# Patient Record
Sex: MALE | Race: WHITE | URBAN - METROPOLITAN AREA
[De-identification: names, ages, dates, MRNs, and addresses within clinical notes are randomized per-mention and may not be internally consistent; named-entity substitution may affect disease eponyms.]

---

## 2017-01-04 ENCOUNTER — OFFICE (OUTPATIENT)
Dept: URBAN - METROPOLITAN AREA CLINIC 8 | Facility: CLINIC | Age: 62
Setting detail: OPHTHALMOLOGY
End: 2017-01-04
Payer: COMMERCIAL

## 2017-01-04 DIAGNOSIS — E11.9: ICD-10-CM

## 2017-01-04 DIAGNOSIS — H25.13: ICD-10-CM

## 2017-01-04 DIAGNOSIS — H52.4: ICD-10-CM

## 2017-01-04 PROCEDURE — 92014 COMPRE OPH EXAM EST PT 1/>: CPT | Performed by: OPHTHALMOLOGY

## 2017-01-04 PROCEDURE — 92015 DETERMINE REFRACTIVE STATE: CPT | Performed by: OPHTHALMOLOGY

## 2017-01-04 ASSESSMENT — REFRACTION_MANIFEST
OS_VA1: 20/20-
OD_CYLINDER: -1.00
OS_ADD: +2.25
OD_VA3: 20/
OS_VA2: 20/
OD_VA1: 20/25+
OD_ADD: +2.25
OD_VA2: 20/
OS_CYLINDER: -2.50
OS_VA3: 20/
OS_VA1: 20/
OU_VA: 20/
OS_VA3: 20/
OS_AXIS: 085
OD_SPHERE: -0.25
OS_VA2: 20/
OD_VA3: 20/
OS_SPHERE: +0.75
OD_AXIS: 035
OD_VA1: 20/
OU_VA: 20/
OD_VA2: 20/

## 2017-01-04 ASSESSMENT — CONFRONTATIONAL VISUAL FIELD TEST (CVF)
OD_FINDINGS: FULL
OS_FINDINGS: FULL

## 2017-01-04 ASSESSMENT — REFRACTION_OUTSIDERX
OD_VA2: 20/
OD_VA1: 20/20
OD_CYLINDER: -1.00
OS_VA2: 20/
OD_VA3: 20/
OS_ADD: +2.50
OS_VA1: 20/20
OS_AXIS: 085
OD_ADD: +2.50
OD_AXIS: 035
OS_CYLINDER: -2.25
OS_SPHERE: +0.75
OD_SPHERE: -0.50
OU_VA: 20/
OS_VA3: 20/

## 2017-01-04 ASSESSMENT — SPHEQUIV_DERIVED
OS_SPHEQUIV: -0.5
OD_SPHEQUIV: -0.75

## 2017-01-06 ASSESSMENT — SPHEQUIV_DERIVED
OS_SPHEQUIV: -0.25
OD_SPHEQUIV: -0.875

## 2017-01-06 ASSESSMENT — REFRACTION_CURRENTRX
OD_OVR_VA: 20/
OS_ADD: +2.25
OS_VPRISM_DIRECTION: PROGS
OS_CYLINDER: -2.50
OS_AXIS: 085
OD_OVR_VA: 20/
OD_OVR_VA: 20/
OD_VPRISM_DIRECTION: PROGS
OS_OVR_VA: 20/
OD_CYLINDER: -1.00
OS_SPHERE: +0.75
OD_AXIS: 035
OD_SPHERE: -0.25
OS_OVR_VA: 20/
OS_OVR_VA: 20/
OD_ADD: +2.25

## 2017-01-06 ASSESSMENT — KERATOMETRY
OS_K1POWER_DIOPTERS: 44.25
OD_K1POWER_DIOPTERS: 45.00
METHOD_AUTO_MANUAL: AUTO
OD_K2POWER_DIOPTERS: 46.00
OS_K2POWER_DIOPTERS: 46.25
OS_AXISANGLE_DEGREES: 178
OD_AXISANGLE_DEGREES: 124

## 2017-01-06 ASSESSMENT — REFRACTION_AUTOREFRACTION
OS_AXIS: 088
OD_AXIS: 034
OS_SPHERE: +1.50
OS_CYLINDER: -3.50
OD_SPHERE: -0.25
OD_CYLINDER: -1.25

## 2017-01-06 ASSESSMENT — AXIALLENGTH_DERIVED
OD_AL: 23.2047
OS_AL: 23.0588

## 2017-01-06 ASSESSMENT — VISUAL ACUITY
OD_BCVA: 20/20
OS_BCVA: 20/25+

## 2017-05-08 ENCOUNTER — RX ONLY (RX ONLY)
Age: 62
End: 2017-05-08

## 2017-06-09 ENCOUNTER — RX ONLY (RX ONLY)
Age: 62
End: 2017-06-09

## 2017-06-30 ENCOUNTER — RX ONLY (RX ONLY)
Age: 62
End: 2017-06-30

## 2017-07-06 ENCOUNTER — RX ONLY (RX ONLY)
Age: 62
End: 2017-07-06

## 2017-11-16 ENCOUNTER — RX ONLY (RX ONLY)
Age: 62
End: 2017-11-16

## 2017-12-14 ENCOUNTER — RX ONLY (RX ONLY)
Age: 62
End: 2017-12-14

## 2018-01-09 ENCOUNTER — OFFICE (OUTPATIENT)
Dept: URBAN - METROPOLITAN AREA CLINIC 8 | Facility: CLINIC | Age: 63
Setting detail: OPHTHALMOLOGY
End: 2018-01-09
Payer: COMMERCIAL

## 2018-01-09 DIAGNOSIS — H52.4: ICD-10-CM

## 2018-01-09 DIAGNOSIS — H25.13: ICD-10-CM

## 2018-01-09 DIAGNOSIS — E11.9: ICD-10-CM

## 2018-01-09 PROCEDURE — 92015 DETERMINE REFRACTIVE STATE: CPT | Performed by: OPHTHALMOLOGY

## 2018-01-09 PROCEDURE — 92014 COMPRE OPH EXAM EST PT 1/>: CPT | Performed by: OPHTHALMOLOGY

## 2018-01-09 ASSESSMENT — REFRACTION_CURRENTRX
OS_OVR_VA: 20/
OD_OVR_VA: 20/
OD_SPHERE: -0.25
OS_VPRISM_DIRECTION: PROGS
OS_ADD: +2.25
OS_OVR_VA: 20/
OS_SPHERE: +0.75
OD_CYLINDER: -1.00
OD_VPRISM_DIRECTION: PROGS
OD_AXIS: 035
OS_AXIS: 085
OD_ADD: +2.25
OD_OVR_VA: 20/
OD_OVR_VA: 20/
OS_CYLINDER: -2.50
OS_OVR_VA: 20/

## 2018-01-09 ASSESSMENT — REFRACTION_MANIFEST
OD_VA1: 20/
OS_VA3: 20/
OS_VA2: 20/
OS_VA1: 20/
OU_VA: 20/
OD_VA3: 20/
OD_VA2: 20/

## 2018-01-09 ASSESSMENT — CONFRONTATIONAL VISUAL FIELD TEST (CVF)
OS_FINDINGS: FULL
OD_FINDINGS: FULL

## 2018-01-09 ASSESSMENT — REFRACTION_OUTSIDERX
OS_ADD: +2.50
OS_CYLINDER: -2.50
OS_SPHERE: +0.75
OD_ADD: +2.50
OD_CYLINDER: -1.25
OD_SPHERE: -0.25
OD_VA2: 20/
OS_ADD: +2.50
OS_VA1: 20/20
OS_SPHERE: +1.00
OD_AXIS: 030
OD_VA2: 20/30(J2)
OD_VA1: 20/20
OS_VA3: 20/
OD_VA3: 20/
OS_VA2: 20/
OD_ADD: +2.50
OD_VA1: 20/25+
OU_VA: 20/
OD_AXIS: 035
OD_VA3: 20/
OU_VA: 20/
OS_VA2: 20/20(J1+)
OD_SPHERE: -0.50
OS_AXIS: 085
OS_VA3: 20/
OD_CYLINDER: -1.00
OS_VA1: 20/20-
OS_AXIS: 085
OS_CYLINDER: -2.75

## 2018-01-09 ASSESSMENT — KERATOMETRY
OS_K2POWER_DIOPTERS: 46.25
OS_K1POWER_DIOPTERS: 44.50
OD_K2POWER_DIOPTERS: 46.50
METHOD_AUTO_MANUAL: AUTO
OD_AXISANGLE_DEGREES: 114
OS_AXISANGLE_DEGREES: 177
OD_K1POWER_DIOPTERS: 45.25

## 2018-01-09 ASSESSMENT — REFRACTION_AUTOREFRACTION
OD_CYLINDER: -1.25
OS_SPHERE: +1.75
OS_AXIS: 085
OS_CYLINDER: -3.50
OD_SPHERE: PLANO
OD_AXIS: 031

## 2018-01-09 ASSESSMENT — AXIALLENGTH_DERIVED: OS_AL: 22.9227

## 2018-01-09 ASSESSMENT — SPHEQUIV_DERIVED: OS_SPHEQUIV: 0

## 2018-01-09 ASSESSMENT — VISUAL ACUITY
OS_BCVA: 20/20-
OD_BCVA: 20/20

## 2018-10-04 ENCOUNTER — RX ONLY (RX ONLY)
Age: 63
End: 2018-10-04

## 2019-02-05 ENCOUNTER — OFFICE (OUTPATIENT)
Dept: URBAN - METROPOLITAN AREA CLINIC 8 | Facility: CLINIC | Age: 64
Setting detail: OPHTHALMOLOGY
End: 2019-02-05
Payer: COMMERCIAL

## 2019-02-05 DIAGNOSIS — H52.4: ICD-10-CM

## 2019-02-05 DIAGNOSIS — H25.13: ICD-10-CM

## 2019-02-05 DIAGNOSIS — E11.9: ICD-10-CM

## 2019-02-05 PROCEDURE — 92015 DETERMINE REFRACTIVE STATE: CPT | Performed by: OPHTHALMOLOGY

## 2019-02-05 PROCEDURE — 92014 COMPRE OPH EXAM EST PT 1/>: CPT | Performed by: OPHTHALMOLOGY

## 2019-02-05 ASSESSMENT — REFRACTION_MANIFEST
OU_VA: 20/
OD_VA3: 20/
OS_VA2: 20/20(J1+)
OD_ADD: +2.50
OD_SPHERE: -0.25
OD_VA1: 20/25
OD_VA3: 20/
OS_SPHERE: +1.00
OS_VA2: 20/20(J1+)
OS_ADD: +2.50
OS_AXIS: 085
OS_VA3: 20/
OD_CYLINDER: -1.00
OD_AXIS: 035
OS_CYLINDER: -2.50
OD_AXIS: 035
OS_CYLINDER: -2.50
OD_ADD: +2.50
OD_SPHERE: -0.25
OD_CYLINDER: -1.00
OD_VA2: 20/25(J1)
OS_AXIS: 085
OS_VA3: 20/
OS_SPHERE: +1.00
OD_VA1: 20/25
OD_VA2: 20/25(J1)
OU_VA: 20/
OS_ADD: +2.50
OS_VA1: 20/20
OS_VA1: 20/20

## 2019-02-05 ASSESSMENT — AXIALLENGTH_DERIVED
OS_AL: 23.1028
OS_AL: 23.1028
OD_AL: 23.1135
OS_AL: 23.1028
OD_AL: 23.1135

## 2019-02-05 ASSESSMENT — SPHEQUIV_DERIVED
OS_SPHEQUIV: -0.25
OD_SPHEQUIV: -0.75
OS_SPHEQUIV: -0.25
OD_SPHEQUIV: -0.75
OS_SPHEQUIV: -0.25

## 2019-02-05 ASSESSMENT — VISUAL ACUITY
OD_BCVA: 20/20-
OS_BCVA: 20/25

## 2019-02-05 ASSESSMENT — REFRACTION_CURRENTRX
OS_OVR_VA: 20/
OS_OVR_VA: 20/
OD_OVR_VA: 20/
OD_OVR_VA: 20/
OD_VPRISM_DIRECTION: PROGS
OD_ADD: +2.25
OD_SPHERE: -0.25
OS_OVR_VA: 20/
OS_CYLINDER: -2.50
OS_ADD: +2.25
OS_AXIS: 085
OD_AXIS: 035
OD_OVR_VA: 20/
OS_SPHERE: +0.75
OD_CYLINDER: -1.00
OS_VPRISM_DIRECTION: PROGS

## 2019-02-05 ASSESSMENT — KERATOMETRY
METHOD_AUTO_MANUAL: AUTO
OS_AXISANGLE_DEGREES: 176
OD_K1POWER_DIOPTERS: 45.25
OD_AXISANGLE_DEGREES: 124
OS_K2POWER_DIOPTERS: 46.25
OS_K1POWER_DIOPTERS: 44.00
OD_K2POWER_DIOPTERS: 46.00

## 2019-02-05 ASSESSMENT — REFRACTION_AUTOREFRACTION
OS_CYLINDER: -3.50
OS_SPHERE: +1.50
OS_AXIS: 086
OD_AXIS: 033
OD_CYLINDER: -1.25
OD_SPHERE: PLANO

## 2019-02-05 ASSESSMENT — CONFRONTATIONAL VISUAL FIELD TEST (CVF)
OS_FINDINGS: FULL
OD_FINDINGS: FULL

## 2019-10-17 ENCOUNTER — OFFICE (OUTPATIENT)
Dept: URBAN - METROPOLITAN AREA CLINIC 113 | Facility: CLINIC | Age: 64
Setting detail: OPHTHALMOLOGY
End: 2019-10-17
Payer: COMMERCIAL

## 2019-10-17 DIAGNOSIS — H40.31X3: ICD-10-CM

## 2019-10-17 DIAGNOSIS — H25.12: ICD-10-CM

## 2019-10-17 DIAGNOSIS — Z96.1: ICD-10-CM

## 2019-10-17 DIAGNOSIS — H47.232: ICD-10-CM

## 2019-10-17 PROCEDURE — 92250 FUNDUS PHOTOGRAPHY W/I&R: CPT | Performed by: OPHTHALMOLOGY

## 2019-10-17 PROCEDURE — 92014 COMPRE OPH EXAM EST PT 1/>: CPT | Performed by: OPHTHALMOLOGY

## 2019-10-17 PROCEDURE — 92083 EXTENDED VISUAL FIELD XM: CPT | Performed by: OPHTHALMOLOGY

## 2019-10-17 ASSESSMENT — REFRACTION_MANIFEST
OS_VA2: 20/20
OS_SPHERE: +1.50
OD_AXIS: 075
OS_VA3: 20/
OS_VA1: 20/20
OD_VA3: 20/
OD_AXIS: 075
OS_SPHERE: +1.50
OD_VA1: 20/25-
OD_ADD: +2.50
OD_VA3: 20/
OS_AXIS: 090
OD_SPHERE: +1.00
OD_ADD: +2.50
OD_CYLINDER: -0.75
OD_VA2: 20/20
OD_CYLINDER: -1.00
OS_VA2: 20/
OD_VA1: 20/20-1
OD_SPHERE: +0.25
OS_AXIS: 090
OU_VA: 20/
OD_VA2: 20/
OS_CYLINDER: -0.75
OS_VA3: 20/
OS_ADD: +2.50
OS_CYLINDER: -0.75
OS_VA1: 20/20
OS_ADD: +2.50
OU_VA: 20/

## 2019-10-17 ASSESSMENT — REFRACTION_CURRENTRX
OD_VPRISM_DIRECTION: PROGS
OS_ADD: +2.50
OD_CYLINDER: -0.75
OS_CYLINDER: -0.75
OS_AXIS: 067
OD_SPHERE: +1.50
OD_ADD: +2.50
OS_OVR_VA: 20/
OD_AXIS: 096
OS_VPRISM_DIRECTION: PROGS
OS_SPHERE: +1.00
OD_OVR_VA: 20/

## 2019-10-17 ASSESSMENT — SPHEQUIV_DERIVED
OS_SPHEQUIV: 1.125
OS_SPHEQUIV: 1.5
OS_SPHEQUIV: 1.125
OD_SPHEQUIV: 0.625
OD_SPHEQUIV: -0.25

## 2019-10-17 ASSESSMENT — KERATOMETRY
OS_K2POWER_DIOPTERS: 47.00
METHOD_AUTO_MANUAL: AUTO
OD_K1POWER_DIOPTERS: 45.75
OS_K1POWER_DIOPTERS: 45.75
OD_K2POWER_DIOPTERS: 46.25
OD_AXISANGLE_DEGREES: 118
OS_AXISANGLE_DEGREES: 001

## 2019-10-17 ASSESSMENT — REFRACTION_AUTOREFRACTION
OS_SPHERE: +2.25
OD_SPHERE: UNABLE
OS_CYLINDER: -1.50
OS_AXIS: 089

## 2019-10-17 ASSESSMENT — VISUAL ACUITY
OS_BCVA: 20/25
OD_BCVA: 20/20

## 2019-10-17 ASSESSMENT — AXIALLENGTH_DERIVED
OD_AL: 22.4847
OS_AL: 22.0581
OD_AL: 22.7986
OS_AL: 22.1866
OS_AL: 22.1866

## 2019-10-17 ASSESSMENT — CONFRONTATIONAL VISUAL FIELD TEST (CVF)
OD_FINDINGS: FULL
OS_FINDINGS: FULL

## 2020-06-11 ENCOUNTER — OFFICE (OUTPATIENT)
Dept: URBAN - METROPOLITAN AREA CLINIC 113 | Facility: CLINIC | Age: 65
Setting detail: OPHTHALMOLOGY
End: 2020-06-11
Payer: COMMERCIAL

## 2020-06-11 DIAGNOSIS — H25.12: ICD-10-CM

## 2020-06-11 DIAGNOSIS — Z96.1: ICD-10-CM

## 2020-06-11 DIAGNOSIS — H47.232: ICD-10-CM

## 2020-06-11 DIAGNOSIS — H40.31X3: ICD-10-CM

## 2020-06-11 PROCEDURE — 92133 CPTRZD OPH DX IMG PST SGM ON: CPT | Performed by: OPHTHALMOLOGY

## 2020-06-11 PROCEDURE — 92014 COMPRE OPH EXAM EST PT 1/>: CPT | Performed by: OPHTHALMOLOGY

## 2020-06-11 PROCEDURE — 92020 GONIOSCOPY: CPT | Performed by: OPHTHALMOLOGY

## 2020-06-11 ASSESSMENT — REFRACTION_MANIFEST
OS_CYLINDER: -0.75
OD_AXIS: 075
OD_ADD: +2.50
OS_AXIS: 090
OS_AXIS: 090
OD_AXIS: 075
OD_SPHERE: +1.00
OS_ADD: +2.50
OS_CYLINDER: -0.75
OS_SPHERE: +1.50
OS_VA2: 20/20
OD_SPHERE: +0.25
OD_ADD: +2.50
OD_CYLINDER: -0.75
OD_VA1: 20/20-1
OS_VA1: 20/20
OS_ADD: +2.50
OD_VA1: 20/25-
OS_VA1: 20/20
OS_SPHERE: +1.50
OD_CYLINDER: -1.00
OD_VA2: 20/20

## 2020-06-11 ASSESSMENT — REFRACTION_AUTOREFRACTION
OS_SPHERE: +2.25
OD_SPHERE: UNABLE
OS_CYLINDER: -1.50
OS_AXIS: 089

## 2020-06-11 ASSESSMENT — REFRACTION_CURRENTRX
OS_AXIS: 067
OS_CYLINDER: -0.75
OD_ADD: +2.50
OD_SPHERE: +1.50
OD_OVR_VA: 20/
OD_VPRISM_DIRECTION: PROGS
OS_OVR_VA: 20/
OS_VPRISM_DIRECTION: PROGS
OD_AXIS: 096
OS_SPHERE: +1.00
OD_CYLINDER: -0.75
OS_ADD: +2.50

## 2020-06-11 ASSESSMENT — CONFRONTATIONAL VISUAL FIELD TEST (CVF)
OS_FINDINGS: FULL
OD_FINDINGS: FULL

## 2020-06-11 ASSESSMENT — KERATOMETRY
OS_AXISANGLE_DEGREES: 001
OD_K1POWER_DIOPTERS: 45.75
OS_K2POWER_DIOPTERS: 47.00
OD_AXISANGLE_DEGREES: 118
OD_K2POWER_DIOPTERS: 46.25
METHOD_AUTO_MANUAL: AUTO
OS_K1POWER_DIOPTERS: 45.75

## 2020-06-11 ASSESSMENT — VISUAL ACUITY
OS_BCVA: 20/30
OD_BCVA: 20/20

## 2020-06-11 ASSESSMENT — AXIALLENGTH_DERIVED
OD_AL: 22.7986
OS_AL: 22.1866
OD_AL: 22.4847
OS_AL: 22.0581
OS_AL: 22.1866

## 2020-06-11 ASSESSMENT — PACHYMETRY
OS_CT_UM: 536
OS_CT_CORRECTION: 1
OD_CT_CORRECTION: 0
OD_CT_UM: 541

## 2020-06-11 ASSESSMENT — TONOMETRY
OS_IOP_MMHG: 14
OD_IOP_MMHG: 15

## 2020-06-11 ASSESSMENT — SPHEQUIV_DERIVED
OS_SPHEQUIV: 1.5
OD_SPHEQUIV: -0.25
OD_SPHEQUIV: 0.625
OS_SPHEQUIV: 1.125
OS_SPHEQUIV: 1.125

## 2021-01-21 ENCOUNTER — OFFICE (OUTPATIENT)
Dept: URBAN - METROPOLITAN AREA CLINIC 113 | Facility: CLINIC | Age: 66
Setting detail: OPHTHALMOLOGY
End: 2021-01-21
Payer: COMMERCIAL

## 2021-01-21 DIAGNOSIS — Z96.1: ICD-10-CM

## 2021-01-21 DIAGNOSIS — H47.232: ICD-10-CM

## 2021-01-21 DIAGNOSIS — H40.31X3: ICD-10-CM

## 2021-01-21 DIAGNOSIS — H25.12: ICD-10-CM

## 2021-01-21 PROCEDURE — 92250 FUNDUS PHOTOGRAPHY W/I&R: CPT | Performed by: OPHTHALMOLOGY

## 2021-01-21 PROCEDURE — 99213 OFFICE O/P EST LOW 20 MIN: CPT | Performed by: OPHTHALMOLOGY

## 2021-01-21 ASSESSMENT — KERATOMETRY
OS_K1POWER_DIOPTERS: 46.00
OS_AXISANGLE_DEGREES: 165
OD_K2POWER_DIOPTERS: 46.00
OD_AXISANGLE_DEGREES: 090
OD_K1POWER_DIOPTERS: 46.00
METHOD_AUTO_MANUAL: AUTO
OS_K2POWER_DIOPTERS: 46.75

## 2021-01-21 ASSESSMENT — REFRACTION_MANIFEST
OS_VA1: 20/20
OS_VA2: 20/20
OD_VA2: 20/20
OD_SPHERE: +0.25
OS_VA1: 20/20
OD_ADD: +2.50
OS_CYLINDER: -0.75
OD_AXIS: 075
OD_SPHERE: +1.00
OS_CYLINDER: -0.75
OD_CYLINDER: -0.75
OD_AXIS: 075
OD_ADD: +2.50
OS_ADD: +2.50
OS_SPHERE: +1.50
OS_ADD: +2.50
OS_SPHERE: +1.50
OD_VA1: 20/20-1
OS_AXIS: 090
OD_CYLINDER: -1.00
OD_VA1: 20/25-
OS_AXIS: 090

## 2021-01-21 ASSESSMENT — REFRACTION_AUTOREFRACTION
OD_AXIS: 0
OD_CYLINDER: 0.00
OS_AXIS: 087
OS_CYLINDER: -1.50
OD_SPHERE: +25.00
OS_SPHERE: +2.25

## 2021-01-21 ASSESSMENT — AXIALLENGTH_DERIVED
OS_AL: 22.0581
OS_AL: 22.1866
OD_AL: 16.25
OD_AL: 22.4847
OS_AL: 22.1866
OD_AL: 22.7986

## 2021-01-21 ASSESSMENT — REFRACTION_CURRENTRX
OD_ADD: +2.50
OS_CYLINDER: -0.50
OD_ADD: +2.50
OS_AXIS: 067
OD_CYLINDER: -1.00
OD_AXIS: 096
OS_AXIS: 095
OS_CYLINDER: -0.75
OS_VPRISM_DIRECTION: PROGS
OD_OVR_VA: 20/
OS_ADD: +2.50
OS_SPHERE: +1.50
OD_CYLINDER: -0.75
OD_OVR_VA: 20/
OS_OVR_VA: 20/
OD_AXIS: 078
OS_SPHERE: +1.00
OD_SPHERE: +0.75
OD_SPHERE: +1.50
OS_OVR_VA: 20/
OD_VPRISM_DIRECTION: PROGS
OS_ADD: +2.50
OD_VPRISM_DIRECTION: PROGS
OS_VPRISM_DIRECTION: PROGS

## 2021-01-21 ASSESSMENT — PACHYMETRY
OS_CT_UM: 536
OS_CT_CORRECTION: 1
OD_CT_UM: 541
OD_CT_CORRECTION: 0

## 2021-01-21 ASSESSMENT — SPHEQUIV_DERIVED
OS_SPHEQUIV: 1.125
OD_SPHEQUIV: 0.625
OD_SPHEQUIV: -0.25
OS_SPHEQUIV: 1.125
OS_SPHEQUIV: 1.5
OD_SPHEQUIV: 25

## 2021-01-21 ASSESSMENT — TONOMETRY
OD_IOP_MMHG: 14
OS_IOP_MMHG: 14

## 2021-01-21 ASSESSMENT — CONFRONTATIONAL VISUAL FIELD TEST (CVF)
OS_FINDINGS: FULL
OD_FINDINGS: FULL

## 2021-01-21 ASSESSMENT — VISUAL ACUITY
OD_BCVA: 20/20
OS_BCVA: 20/30-1

## 2021-12-18 ENCOUNTER — OFFICE (OUTPATIENT)
Dept: URBAN - METROPOLITAN AREA CLINIC 8 | Facility: CLINIC | Age: 66
Setting detail: OPHTHALMOLOGY
End: 2021-12-18
Payer: COMMERCIAL

## 2021-12-18 DIAGNOSIS — H25.13: ICD-10-CM

## 2021-12-18 DIAGNOSIS — E11.3291: ICD-10-CM

## 2021-12-18 DIAGNOSIS — H35.54: ICD-10-CM

## 2021-12-18 PROCEDURE — 92014 COMPRE OPH EXAM EST PT 1/>: CPT | Performed by: OPHTHALMOLOGY

## 2021-12-18 ASSESSMENT — AXIALLENGTH_DERIVED
OD_AL: 22.982
OD_AL: 22.982
OD_AL: 23.0283
OS_AL: 22.8847
OS_AL: 22.8847
OS_AL: 22.839

## 2021-12-18 ASSESSMENT — SPHEQUIV_DERIVED
OS_SPHEQUIV: -0.125
OS_SPHEQUIV: -0.25
OS_SPHEQUIV: -0.25
OD_SPHEQUIV: -0.75
OD_SPHEQUIV: -0.75
OD_SPHEQUIV: -0.875

## 2021-12-18 ASSESSMENT — REFRACTION_MANIFEST
OD_AXIS: 035
OD_SPHERE: -0.25
OS_VA2: 20/20(J1+)
OS_SPHERE: +1.00
OS_AXIS: 085
OD_CYLINDER: -1.25
OD_CYLINDER: -1.00
OD_ADD: +2.50
OS_SPHERE: +1.25
OS_VA1: 20/20-2
OD_SPHERE: -0.25
OD_VA2: 20/20(J1+)
OS_VA2: 20/20(J1+)
OS_AXIS: 085
OD_VA1: 20/30-2
OS_VA1: 20/20
OD_AXIS: 035
OU_VA: 20/20
OS_ADD: +2.50
OD_VA2: 20/25(J1)
OD_VA1: 20/25
OD_ADD: +2.50
OS_ADD: +2.50
OS_CYLINDER: -2.50
OS_CYLINDER: -2.75

## 2021-12-18 ASSESSMENT — REFRACTION_AUTOREFRACTION
OS_AXIS: 087
OD_AXIS: 044
OS_SPHERE: +1.50
OD_CYLINDER: -1.00
OS_CYLINDER: -3.50
OD_SPHERE: -0.25

## 2021-12-18 ASSESSMENT — CONFRONTATIONAL VISUAL FIELD TEST (CVF)
OD_FINDINGS: FULL
OS_FINDINGS: FULL

## 2021-12-18 ASSESSMENT — KERATOMETRY
METHOD_AUTO_MANUAL: AUTO
OD_AXISANGLE_DEGREES: 119
OS_K1POWER_DIOPTERS: 45.00
OD_K1POWER_DIOPTERS: 45.50
OD_K2POWER_DIOPTERS: 46.50
OS_K2POWER_DIOPTERS: 46.50
OS_AXISANGLE_DEGREES: 179

## 2021-12-18 ASSESSMENT — REFRACTION_CURRENTRX
OS_CYLINDER: -2.75
OD_OVR_VA: 20/
OD_SPHERE: -0.25
OS_AXIS: 081
OD_CYLINDER: -1.00
OS_OVR_VA: 20/
OD_AXIS: 034
OD_ADD: +2.50
OS_VPRISM_DIRECTION: PROGS
OS_ADD: +2.50
OS_SPHERE: +1.25
OD_VPRISM_DIRECTION: PROGS

## 2021-12-18 ASSESSMENT — VISUAL ACUITY
OS_BCVA: 20/30-2
OD_BCVA: 20/20-2

## 2022-01-01 ENCOUNTER — OFFICE VISIT (OUTPATIENT)
Dept: HEMATOLOGY/ONCOLOGY | Facility: CLINIC | Age: 67
End: 2022-01-01
Payer: MEDICARE

## 2022-01-01 ENCOUNTER — TELEPHONE (OUTPATIENT)
Dept: HEMATOLOGY/ONCOLOGY | Facility: CLINIC | Age: 67
End: 2022-01-01
Payer: MEDICARE

## 2022-01-01 VITALS
SYSTOLIC BLOOD PRESSURE: 161 MMHG | OXYGEN SATURATION: 98 % | TEMPERATURE: 99 F | RESPIRATION RATE: 18 BRPM | WEIGHT: 167 LBS | HEART RATE: 66 BPM | HEIGHT: 71 IN | BODY MASS INDEX: 23.38 KG/M2 | DIASTOLIC BLOOD PRESSURE: 76 MMHG

## 2022-01-01 VITALS
SYSTOLIC BLOOD PRESSURE: 125 MMHG | BODY MASS INDEX: 23.55 KG/M2 | DIASTOLIC BLOOD PRESSURE: 74 MMHG | OXYGEN SATURATION: 99 % | BODY MASS INDEX: 23.57 KG/M2 | WEIGHT: 168.38 LBS | TEMPERATURE: 99 F | HEART RATE: 56 BPM | OXYGEN SATURATION: 98 % | HEIGHT: 71 IN | HEIGHT: 71 IN | RESPIRATION RATE: 16 BRPM | HEART RATE: 65 BPM | RESPIRATION RATE: 18 BRPM | TEMPERATURE: 98 F | DIASTOLIC BLOOD PRESSURE: 70 MMHG | WEIGHT: 168.19 LBS | SYSTOLIC BLOOD PRESSURE: 157 MMHG

## 2022-01-01 VITALS
BODY MASS INDEX: 23.05 KG/M2 | RESPIRATION RATE: 18 BRPM | HEART RATE: 59 BPM | WEIGHT: 164.63 LBS | HEIGHT: 71 IN | SYSTOLIC BLOOD PRESSURE: 148 MMHG | DIASTOLIC BLOOD PRESSURE: 69 MMHG | OXYGEN SATURATION: 96 % | TEMPERATURE: 98 F

## 2022-01-01 VITALS
WEIGHT: 155.81 LBS | DIASTOLIC BLOOD PRESSURE: 68 MMHG | TEMPERATURE: 98 F | OXYGEN SATURATION: 98 % | RESPIRATION RATE: 18 BRPM | BODY MASS INDEX: 21.73 KG/M2 | SYSTOLIC BLOOD PRESSURE: 103 MMHG | HEART RATE: 98 BPM

## 2022-01-01 DIAGNOSIS — C32.1 MALIGNANT NEOPLASM OF SUPRAGLOTTIS: ICD-10-CM

## 2022-01-01 DIAGNOSIS — C61 PROSTATE CANCER: Primary | ICD-10-CM

## 2022-01-01 DIAGNOSIS — C32.9 LARYNGEAL SQUAMOUS CELL CARCINOMA: Primary | ICD-10-CM

## 2022-01-01 DIAGNOSIS — C61 PROSTATE CANCER: ICD-10-CM

## 2022-01-01 DIAGNOSIS — Z51.11 ENCOUNTER FOR ANTINEOPLASTIC CHEMOTHERAPY: ICD-10-CM

## 2022-01-01 DIAGNOSIS — G89.3 CANCER RELATED PAIN: ICD-10-CM

## 2022-01-01 DIAGNOSIS — H91.90 HEARING LOSS, UNSPECIFIED HEARING LOSS TYPE, UNSPECIFIED LATERALITY: ICD-10-CM

## 2022-01-01 DIAGNOSIS — C32.9 LARYNGEAL SQUAMOUS CELL CARCINOMA: ICD-10-CM

## 2022-01-01 PROCEDURE — 99215 OFFICE O/P EST HI 40 MIN: CPT | Mod: ,,, | Performed by: NURSE PRACTITIONER

## 2022-01-01 PROCEDURE — 99215 PR OFFICE/OUTPT VISIT, EST, LEVL V, 40-54 MIN: ICD-10-PCS | Mod: ,,, | Performed by: NURSE PRACTITIONER

## 2022-01-01 PROCEDURE — 99205 OFFICE O/P NEW HI 60 MIN: CPT | Mod: ,,, | Performed by: STUDENT IN AN ORGANIZED HEALTH CARE EDUCATION/TRAINING PROGRAM

## 2022-01-01 PROCEDURE — 99215 OFFICE O/P EST HI 40 MIN: CPT | Mod: ,,, | Performed by: STUDENT IN AN ORGANIZED HEALTH CARE EDUCATION/TRAINING PROGRAM

## 2022-01-01 PROCEDURE — 99215 PR OFFICE/OUTPT VISIT, EST, LEVL V, 40-54 MIN: ICD-10-PCS | Mod: ,,, | Performed by: INTERNAL MEDICINE

## 2022-01-01 PROCEDURE — 99205 PR OFFICE/OUTPT VISIT, NEW, LEVL V, 60-74 MIN: ICD-10-PCS | Mod: ,,, | Performed by: STUDENT IN AN ORGANIZED HEALTH CARE EDUCATION/TRAINING PROGRAM

## 2022-01-01 PROCEDURE — 99215 PR OFFICE/OUTPT VISIT, EST, LEVL V, 40-54 MIN: ICD-10-PCS | Mod: ,,, | Performed by: STUDENT IN AN ORGANIZED HEALTH CARE EDUCATION/TRAINING PROGRAM

## 2022-01-01 PROCEDURE — 99215 OFFICE O/P EST HI 40 MIN: CPT | Mod: ,,, | Performed by: INTERNAL MEDICINE

## 2022-01-01 RX ORDER — EPINEPHRINE 0.3 MG/.3ML
0.3 INJECTION SUBCUTANEOUS ONCE AS NEEDED
Status: CANCELLED | OUTPATIENT
Start: 2022-01-01

## 2022-01-01 RX ORDER — PAROXETINE HYDROCHLORIDE 40 MG/1
1 TABLET, FILM COATED ORAL DAILY
COMMUNITY
Start: 2022-01-01

## 2022-01-01 RX ORDER — FAMOTIDINE 10 MG/ML
20 INJECTION INTRAVENOUS
Status: CANCELLED | OUTPATIENT
Start: 2022-01-01

## 2022-01-01 RX ORDER — HYDROCODONE BITARTRATE AND ACETAMINOPHEN 10; 325 MG/1; MG/1
1 TABLET ORAL EVERY 6 HOURS PRN
Qty: 60 TABLET | Refills: 0 | Status: SHIPPED | OUTPATIENT
Start: 2022-01-01 | End: 2022-01-01 | Stop reason: SDUPTHER

## 2022-01-01 RX ORDER — HYDROCODONE BITARTRATE AND ACETAMINOPHEN 7.5; 325 MG/1; MG/1
1 TABLET ORAL EVERY 6 HOURS PRN
COMMUNITY
Start: 2022-01-01 | End: 2022-01-01 | Stop reason: DRUGHIGH

## 2022-01-01 RX ORDER — HEPARIN 100 UNIT/ML
500 SYRINGE INTRAVENOUS
Status: CANCELLED | OUTPATIENT
Start: 2022-01-01

## 2022-01-01 RX ORDER — OXYCODONE AND ACETAMINOPHEN 10; 325 MG/1; MG/1
1 TABLET ORAL EVERY 4 HOURS PRN
COMMUNITY
Start: 2022-01-01

## 2022-01-01 RX ORDER — HYDROCODONE BITARTRATE AND ACETAMINOPHEN 10; 325 MG/1; MG/1
1 TABLET ORAL EVERY 6 HOURS PRN
Qty: 60 TABLET | Refills: 0 | Status: SHIPPED | OUTPATIENT
Start: 2022-01-01

## 2022-01-01 RX ORDER — DIPHENHYDRAMINE HYDROCHLORIDE 50 MG/ML
50 INJECTION INTRAMUSCULAR; INTRAVENOUS ONCE AS NEEDED
Status: CANCELLED | OUTPATIENT
Start: 2022-01-01

## 2022-01-01 RX ORDER — ONDANSETRON HYDROCHLORIDE 8 MG/1
8 TABLET, FILM COATED ORAL EVERY 8 HOURS PRN
Qty: 30 TABLET | Refills: 2 | Status: SHIPPED | OUTPATIENT
Start: 2022-01-01

## 2022-01-01 RX ORDER — SODIUM CHLORIDE 0.9 % (FLUSH) 0.9 %
10 SYRINGE (ML) INJECTION
Status: CANCELLED | OUTPATIENT
Start: 2022-01-01

## 2022-01-01 RX ORDER — DEXAMETHASONE 4 MG/1
TABLET ORAL
Qty: 25 TABLET | Refills: 0 | Status: SHIPPED | OUTPATIENT
Start: 2022-01-01

## 2022-01-01 RX ORDER — FLUTICASONE PROPIONATE 50 MCG
2 SPRAY, SUSPENSION (ML) NASAL DAILY
COMMUNITY

## 2022-01-01 RX ORDER — AMLODIPINE BESYLATE 10 MG/1
10 TABLET ORAL DAILY
COMMUNITY
Start: 2022-01-01

## 2022-01-01 RX ORDER — HYDROCODONE BITARTRATE AND ACETAMINOPHEN 10; 325 MG/1; MG/1
1 TABLET ORAL EVERY 6 HOURS PRN
Qty: 60 TABLET | Refills: 0 | Status: SHIPPED | OUTPATIENT
Start: 2022-01-01 | End: 2022-01-01

## 2022-01-01 RX ORDER — IRBESARTAN 300 MG/1
300 TABLET ORAL DAILY
COMMUNITY
Start: 2022-01-01

## 2022-01-20 ENCOUNTER — RX ONLY (RX ONLY)
Age: 67
End: 2022-01-20

## 2022-01-20 ENCOUNTER — OFFICE (OUTPATIENT)
Dept: URBAN - METROPOLITAN AREA CLINIC 113 | Facility: CLINIC | Age: 67
Setting detail: OPHTHALMOLOGY
End: 2022-01-20
Payer: COMMERCIAL

## 2022-01-20 DIAGNOSIS — H25.12: ICD-10-CM

## 2022-01-20 DIAGNOSIS — Z96.1: ICD-10-CM

## 2022-01-20 DIAGNOSIS — H26.491: ICD-10-CM

## 2022-01-20 DIAGNOSIS — H40.31X3: ICD-10-CM

## 2022-01-20 DIAGNOSIS — H47.232: ICD-10-CM

## 2022-01-20 PROCEDURE — 92250 FUNDUS PHOTOGRAPHY W/I&R: CPT | Performed by: OPHTHALMOLOGY

## 2022-01-20 PROCEDURE — 92014 COMPRE OPH EXAM EST PT 1/>: CPT | Performed by: OPHTHALMOLOGY

## 2022-01-20 PROCEDURE — 92083 EXTENDED VISUAL FIELD XM: CPT | Performed by: OPHTHALMOLOGY

## 2022-01-20 ASSESSMENT — REFRACTION_MANIFEST
OS_ADD: +2.50
OS_AXIS: 090
OD_SPHERE: +1.00
OD_VA1: 20/25-
OD_ADD: +2.50
OS_CYLINDER: -0.75
OS_SPHERE: +1.50
OD_VA2: 20/20
OD_CYLINDER: -1.00
OD_CYLINDER: -0.75
OS_ADD: +2.50
OS_AXIS: 090
OS_VA1: 20/20
OD_AXIS: 075
OD_SPHERE: +0.25
OS_SPHERE: +1.50
OS_VA1: 20/20
OD_AXIS: 075
OD_ADD: +2.50
OD_VA1: 20/20-1
OS_VA2: 20/20
OS_CYLINDER: -0.75

## 2022-01-20 ASSESSMENT — REFRACTION_CURRENTRX
OS_OVR_VA: 20/
OS_CYLINDER: -0.75
OS_VPRISM_DIRECTION: PROGS
OD_SPHERE: +0.75
OD_AXIS: 096
OD_VPRISM_DIRECTION: PROGS
OS_AXIS: 067
OS_ADD: +2.50
OD_AXIS: 078
OD_ADD: +2.50
OS_CYLINDER: -0.50
OD_VPRISM_DIRECTION: PROGS
OS_VPRISM_DIRECTION: PROGS
OD_CYLINDER: -1.00
OS_ADD: +2.50
OD_OVR_VA: 20/
OD_SPHERE: +1.50
OD_CYLINDER: -0.75
OS_OVR_VA: 20/
OS_SPHERE: +1.50
OS_AXIS: 095
OD_OVR_VA: 20/
OS_SPHERE: +1.00
OD_ADD: +2.50

## 2022-01-20 ASSESSMENT — KERATOMETRY
OD_K1POWER_DIOPTERS: 45.75
OD_AXISANGLE_DEGREES: 177
METHOD_AUTO_MANUAL: AUTO
OS_K2POWER_DIOPTERS: 46.50
OS_AXISANGLE_DEGREES: 173
OD_K2POWER_DIOPTERS: 46.25
OS_K1POWER_DIOPTERS: 45.75

## 2022-01-20 ASSESSMENT — REFRACTION_AUTOREFRACTION
OD_CYLINDER: 0.00
OS_CYLINDER: -1.75
OS_SPHERE: +3.00
OD_AXIS: 000
OS_AXIS: 085
OD_SPHERE: +24.75

## 2022-01-20 ASSESSMENT — PACHYMETRY
OS_CT_UM: 536
OS_CT_CORRECTION: 1

## 2022-01-20 ASSESSMENT — VISUAL ACUITY
OD_BCVA: 20/20
OS_BCVA: 20/25

## 2022-01-20 ASSESSMENT — AXIALLENGTH_DERIVED
OD_AL: 16.3
OD_AL: 22.4847
OS_AL: 22.2681
OD_AL: 22.7986
OS_AL: 21.9263
OS_AL: 22.2681

## 2022-01-20 ASSESSMENT — SPHEQUIV_DERIVED
OS_SPHEQUIV: 1.125
OS_SPHEQUIV: 2.125
OD_SPHEQUIV: 24.75
OD_SPHEQUIV: -0.25
OS_SPHEQUIV: 1.125
OD_SPHEQUIV: 0.625

## 2022-01-20 ASSESSMENT — TONOMETRY
OS_IOP_MMHG: 15
OD_IOP_MMHG: 16

## 2022-01-20 ASSESSMENT — CONFRONTATIONAL VISUAL FIELD TEST (CVF)
OD_FINDINGS: FULL
OS_FINDINGS: FULL

## 2022-07-21 ENCOUNTER — OFFICE (OUTPATIENT)
Dept: URBAN - METROPOLITAN AREA CLINIC 113 | Facility: CLINIC | Age: 67
Setting detail: OPHTHALMOLOGY
End: 2022-07-21
Payer: MEDICARE

## 2022-07-21 DIAGNOSIS — H25.12: ICD-10-CM

## 2022-07-21 DIAGNOSIS — H47.232: ICD-10-CM

## 2022-07-21 DIAGNOSIS — H40.31X3: ICD-10-CM

## 2022-07-21 DIAGNOSIS — Z96.1: ICD-10-CM

## 2022-07-21 DIAGNOSIS — H26.491: ICD-10-CM

## 2022-07-21 PROCEDURE — 92133 CPTRZD OPH DX IMG PST SGM ON: CPT | Performed by: OPHTHALMOLOGY

## 2022-07-21 PROCEDURE — 99214 OFFICE O/P EST MOD 30 MIN: CPT | Performed by: OPHTHALMOLOGY

## 2022-07-21 ASSESSMENT — SPHEQUIV_DERIVED
OD_SPHEQUIV: -0.25
OD_SPHEQUIV: 0.625
OS_SPHEQUIV: 1.125
OS_SPHEQUIV: 1.125
OS_SPHEQUIV: 1.75

## 2022-07-21 ASSESSMENT — KERATOMETRY
OS_K1POWER_DIOPTERS: 45.75
OD_K1POWER_DIOPTERS: 46.00
METHOD_AUTO_MANUAL: AUTO
OD_K2POWER_DIOPTERS: 47.00
OS_K2POWER_DIOPTERS: 46.75
OD_AXISANGLE_DEGREES: 168
OS_AXISANGLE_DEGREES: 176

## 2022-07-21 ASSESSMENT — REFRACTION_CURRENTRX
OS_VPRISM_DIRECTION: PROGS
OD_CYLINDER: -0.75
OD_SPHERE: +0.75
OD_ADD: +2.50
OD_AXIS: 078
OD_SPHERE: +1.50
OD_CYLINDER: -1.00
OD_VPRISM_DIRECTION: PROGS
OS_ADD: +2.50
OS_AXIS: 095
OD_ADD: +2.50
OS_CYLINDER: -0.50
OD_VPRISM_DIRECTION: PROGS
OS_OVR_VA: 20/
OS_OVR_VA: 20/
OS_SPHERE: +1.50
OS_ADD: +2.50
OD_AXIS: 096
OS_SPHERE: +1.00
OS_VPRISM_DIRECTION: PROGS
OD_OVR_VA: 20/
OD_OVR_VA: 20/
OS_CYLINDER: -0.75
OS_AXIS: 067

## 2022-07-21 ASSESSMENT — PACHYMETRY
OS_CT_UM: 536
OS_CT_CORRECTION: 1

## 2022-07-21 ASSESSMENT — REFRACTION_MANIFEST
OS_AXIS: 090
OS_VA2: 20/20
OS_SPHERE: +1.50
OD_AXIS: 075
OS_ADD: +2.50
OS_VA1: 20/20
OD_VA1: 20/25-
OS_ADD: +2.50
OS_AXIS: 090
OD_ADD: +2.50
OD_AXIS: 075
OD_ADD: +2.50
OD_VA2: 20/20
OD_CYLINDER: -1.00
OS_SPHERE: +1.50
OD_VA1: 20/20-1
OS_CYLINDER: -0.75
OD_SPHERE: +1.00
OD_CYLINDER: -0.75
OS_VA1: 20/20
OD_SPHERE: +0.25
OS_CYLINDER: -0.75

## 2022-07-21 ASSESSMENT — VISUAL ACUITY
OS_BCVA: 20/30
OD_BCVA: 20/20

## 2022-07-21 ASSESSMENT — REFRACTION_AUTOREFRACTION
OS_CYLINDER: -2.00
OS_AXIS: 083
OS_SPHERE: +2.75
OD_SPHERE: +24.75
OD_CYLINDER: SPHERE

## 2022-07-21 ASSESSMENT — CONFRONTATIONAL VISUAL FIELD TEST (CVF)
OS_FINDINGS: FULL
OD_FINDINGS: FULL

## 2022-07-21 ASSESSMENT — AXIALLENGTH_DERIVED
OD_AL: 22.6284
OS_AL: 22.2273
OS_AL: 22.0132
OS_AL: 22.2273
OD_AL: 22.3191

## 2022-07-21 ASSESSMENT — TONOMETRY: OS_IOP_MMHG: 18

## 2022-08-18 ENCOUNTER — OFFICE (OUTPATIENT)
Dept: URBAN - METROPOLITAN AREA CLINIC 113 | Facility: CLINIC | Age: 67
Setting detail: OPHTHALMOLOGY
End: 2022-08-18
Payer: MEDICARE

## 2022-08-18 DIAGNOSIS — Z96.1: ICD-10-CM

## 2022-08-18 DIAGNOSIS — H26.491: ICD-10-CM

## 2022-08-18 DIAGNOSIS — H40.31X3: ICD-10-CM

## 2022-08-18 DIAGNOSIS — H25.12: ICD-10-CM

## 2022-08-18 DIAGNOSIS — H47.232: ICD-10-CM

## 2022-08-18 PROCEDURE — 92012 INTRM OPH EXAM EST PATIENT: CPT | Performed by: OPHTHALMOLOGY

## 2022-08-18 ASSESSMENT — AXIALLENGTH_DERIVED
OD_AL: 22.928
OS_AL: 22.1866
OD_AL: 16.36
OS_AL: 22.1866
OS_AL: 21.9733
OD_AL: 22.6105

## 2022-08-18 ASSESSMENT — KERATOMETRY
OS_K2POWER_DIOPTERS: 47.00
METHOD_AUTO_MANUAL: AUTO
OS_K1POWER_DIOPTERS: 45.75
OD_AXISANGLE_DEGREES: 003
OS_AXISANGLE_DEGREES: 002
OD_K1POWER_DIOPTERS: 45.00
OD_K2POWER_DIOPTERS: 46.25

## 2022-08-18 ASSESSMENT — REFRACTION_MANIFEST
OS_ADD: +2.50
OS_ADD: +2.50
OD_VA1: 20/25-
OS_CYLINDER: -0.75
OS_AXIS: 090
OS_SPHERE: +1.50
OD_CYLINDER: -1.00
OD_AXIS: 075
OD_ADD: +2.50
OS_SPHERE: +1.50
OD_VA1: 20/20-1
OS_AXIS: 090
OS_VA1: 20/20
OD_VA2: 20/20
OS_VA1: 20/20
OS_VA2: 20/20
OS_CYLINDER: -0.75
OD_SPHERE: +0.25
OD_ADD: +2.50
OD_AXIS: 075
OD_CYLINDER: -0.75
OD_SPHERE: +1.00

## 2022-08-18 ASSESSMENT — PACHYMETRY
OS_CT_CORRECTION: 1
OS_CT_UM: 536

## 2022-08-18 ASSESSMENT — VISUAL ACUITY
OD_BCVA: 20/20-1
OS_BCVA: 20/30

## 2022-08-18 ASSESSMENT — REFRACTION_CURRENTRX
OS_SPHERE: +1.50
OD_CYLINDER: -1.00
OS_OVR_VA: 20/
OS_SPHERE: +1.00
OS_VPRISM_DIRECTION: PROGS
OD_OVR_VA: 20/
OD_ADD: +2.50
OD_AXIS: 096
OS_CYLINDER: -0.50
OD_VPRISM_DIRECTION: PROGS
OS_AXIS: 067
OD_OVR_VA: 20/
OS_CYLINDER: -0.75
OD_SPHERE: +1.50
OD_ADD: +2.50
OS_VPRISM_DIRECTION: PROGS
OS_AXIS: 095
OD_CYLINDER: -0.75
OD_VPRISM_DIRECTION: PROGS
OD_SPHERE: +0.75
OS_OVR_VA: 20/
OD_AXIS: 078
OS_ADD: +2.50
OS_ADD: +2.50

## 2022-08-18 ASSESSMENT — REFRACTION_AUTOREFRACTION
OS_CYLINDER: -2.00
OD_CYLINDER: 0.00
OD_AXIS: 000
OS_SPHERE: +2.75
OD_SPHERE: +24.75
OS_AXIS: 089

## 2022-08-18 ASSESSMENT — SPHEQUIV_DERIVED
OS_SPHEQUIV: 1.75
OD_SPHEQUIV: 0.625
OD_SPHEQUIV: -0.25
OD_SPHEQUIV: 24.75
OS_SPHEQUIV: 1.125
OS_SPHEQUIV: 1.125

## 2022-08-18 ASSESSMENT — CONFRONTATIONAL VISUAL FIELD TEST (CVF)
OD_FINDINGS: FULL
OS_FINDINGS: FULL

## 2022-08-18 ASSESSMENT — TONOMETRY
OS_IOP_MMHG: 16
OD_IOP_MMHG: 18

## 2022-08-19 PROBLEM — H91.90 HEARING LOSS: Status: ACTIVE | Noted: 2022-01-01

## 2022-08-19 PROBLEM — C32.9 LARYNGEAL SQUAMOUS CELL CARCINOMA: Status: ACTIVE | Noted: 2022-01-01

## 2022-08-19 NOTE — PROGRESS NOTES
Referring provider Dr. Cornelius   Reason for consult head and neck squamous cell carcinoma, HPV negative.      Patient is a 67-year-old with prior history of intermediate risk prostate cancer status post definitive radiation therapy who was seen by his primary care provider with symptoms of choking for about 4 months with started around April 2022 and progressively worsened along with changes in her voice.  He was subsequently referred to Dr. Park office and had a CT of his neck done on 30th of June which showed prominence concerning for underlying mass at the level of piriform sinus as well as nonspecific small bilateral adenopathy.  He underwent direct laryngoscopy with biopsy in July 2022 when he was found to have a very bulky fungating supraglottic mass occupying the entirety of the epiglottis as well as the right aryepiglottic fold extending into the tongue base and abutting the lateral pharyngeal wall on the right.  Pathology from this biopsy revealed invasive squamous cell carcinoma, moderately differentiated, HPV negative.  Patient subsequently had a PET-CT which not reveal any evidence of metastatic disease.  He was evaluated by ENT surgeons at [facility] and deemed to be a poor candidate for for surgical resection.  Patient presented to clinic today to establish care for further recommendations including potential concurrent chemotherapy along with definitive radiation therapy.      Today 08/19/2022, patient reports symptoms of persistent dysphagia however is able to tolerate soft foods currently.  He denies any symptoms choking/gagging.  He does report change in his voice which has been stable over the last few months.  He denies any shortness of breath, chest pain, decreased appetite or weight loss.  He denies any foci of pain.  He denies any hemoptysis or hematemesis.      Patient has family history of bladder cancer in his father.  He lives by herself has his mother who lives close by has another  sister who lives around.  He has an ECOG of 0 and continues to be independent with his ADLs and IADLs.  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  He has a heavy smoking history with 100 pack years however is trying to cut down on it and is down to 10 cigarettes per day.  He denies any recreational drug use.  He drinks 2 beers per day.      Pathology  07/18/2022:  Subglottic mass-invasive squamous cell carcinoma, well to moderately differentiated.  HPV negative    Imaging   07/28/2022 PET-CT-hypermetabolic mass centered in the right supraglottis, at the level of piriform sinus in keeping with the known cancer, measuring 2.7 x 2.5 x 3.4 cm with an SUV of 13.4.  No other metastatic disease appreciated.    Past Medical History:   Diagnosis Date    Depression     Hypertension     Prostate cancer        Past Surgical History:   Procedure Laterality Date    KNEE SURGERY      Left    NECK SURGERY      X 3       Family History   Problem Relation Age of Onset    Diabetes Mother     Breast cancer Mother     Bladder Cancer Father     Diabetes Sister     Diabetes Brother     Bladder Cancer Brother        Social History     Socioeconomic History    Marital status:    Tobacco Use    Smoking status: Current Every Day Smoker     Packs/day: 0.50    Smokeless tobacco: Never Used   Substance and Sexual Activity    Alcohol use: Yes     Comment: social       Current Outpatient Medications   Medication Sig Dispense Refill    amLODIPine (NORVASC) 10 MG tablet Take 10 mg by mouth once daily.      fluticasone propionate (FLONASE) 50 mcg/actuation nasal spray 2 sprays by Each Nostril route once daily.      HYDROcodone-acetaminophen (NORCO) 7.5-325 mg per tablet Take 1 tablet by mouth every 6 (six) hours as needed.      irbesartan (AVAPRO) 300 MG tablet Take 300 mg by mouth once daily.      paroxetine (PAXIL) 40 MG tablet Take 1 tablet by mouth once daily.       No current facility-administered  medications for this visit.       Review of patient's allergies indicates:  No Known Allergies    Review of system  CONSTITUTIONAL: no fevers, no chills, no weight loss, no fatigue, no weakness  HEMATOLOGIC: no abnormal bleeding, no abnormal bruising, no drenching night sweats  ONCOLOGIC: no new masses or lumps  HEENT: no vision loss, no tinnitus or+ hearing loss, no nose bleeding, + dysphagia, no odynophagia  CVS: no chest pain, no palpitations, no dyspnea on exertion  RESP: no shortness of breath, no hemoptysis, no cough  GI: no nausea, no vomiting, no diarrhea, no constipation, no melena, no hematochezia, no hematemesis, no abdominal pain, no increase in abdominal girth  : no dysuria, no hematuria, no hesitancy, no scrotal swelling, no discharge  INTEGUMENT: no rashes, no abnormal bruising, no nail pitting, no hyperpigmentation  NEURO: no falls, no memory loss, no paresthesias or dysesthesias, no urofecal incontinence or retention, no loss of strength on any extremity  MSK: no back pain, no new joint pain, no joint swelling  PSYCH: no suicidal or homicidal ideation, no depression, no insomnia, no anhedonia  ENDOCRINE: no heat or cold intolerance, no polyuria, no polydipsia    Physical Exam:  Vitals:    08/19/22 1113   BP: 125/70   Pulse: 65   Resp: 18   Temp: 98.2 °F (36.8 °C)       ECOG PS 0  GA: AAOx3, NAD  HEENT: NCAT, PERRLA, EOMI, edentulous, moist oral mucous membranes, hard of hearing  LYMPH: no cervical, axillary or supraclavicular adenopathy  CVS: s1s2 RRR, no M/R/G  RESP: CTA b/l, no crackles, no wheezes or rhonchi  ABD: soft, NT, ND, BS+, no hepatosplenomegaly  EXT: no deformities, no pedal edema  SKIN: no rashes, warm and dry  NEURO: normal mentation, strength 5/5 on all 4 extremities, no sensory deficits    Assessment and plan     # Laryngeal squamous cell carcinoma, 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    He has been seen by radiation therapy with plans for definitive concurrent chemoradiation  Discussed with patient his diagnosis, stage and treatment recommendations.    Discussed the option to use weekly cisplatin versus carbo Taxol depending on his renal function and hearing assessment.    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    Plan to obtain a CBC, CMP today along with an audiology assessment with Dr. Garcia.  We will tentatively schedule patient for chemo education next week.  Patient's start date to be coordinated closely with Radiation Oncology    # Intermediate risk prostate cancer status post definitive radiation therapy in October 2019 currently on surveillance with Radiation Oncology.    Plan   # CBC, CMP today   # audiology evaluation with Dr. Garcia  # chemo education   # plan to choose between weekly cisplatin versus weekly carbo Taxol depending on above workup.    # start date for chemotherapy to be closely coordinated with radiation therapy   # follow-up in clinic prior to cycle 2 to assess treatment tolerance      A total of  60 minutes were spent in review of records, interpretation of test, coordination of care, discussion and counseling with the patient.        Portions of the record may have been created with voice recognition software. Occasional wrong-word or sound-a-like substitutions may have occurred due to the inherent limitations of voice recognition software. Read the chart carefully and recognize, using context, where substitutions have occurred.

## 2022-08-30 NOTE — PROGRESS NOTES
Referring provider Dr. Cornelius   Reason for consult head and neck squamous cell carcinoma, HPV negative.      Patient is a 67-year-old with prior history of intermediate risk prostate cancer status post definitive radiation therapy who was seen by his primary care provider with symptoms of choking for about 4 months with started around April 2022 and progressively worsened along with changes in her voice.  He was subsequently referred to Dr. Park office and had a CT of his neck done on 30th of June which showed prominence concerning for underlying mass at the level of piriform sinus as well as nonspecific small bilateral adenopathy.  He underwent direct laryngoscopy with biopsy in July 2022 when he was found to have a very bulky fungating supraglottic mass occupying the entirety of the epiglottis as well as the right aryepiglottic fold extending into the tongue base and abutting the lateral pharyngeal wall on the right.  Pathology from this biopsy revealed invasive squamous cell carcinoma, moderately differentiated, HPV negative.  Patient subsequently had a PET-CT which not reveal any evidence of metastatic disease.  He was evaluated by ENT surgeons at [facility] and deemed to be a poor candidate for for surgical resection.  Patient presented to clinic today to establish care for further recommendations including potential concurrent chemotherapy along with definitive radiation therapy.      Today 08/30/2022, patient presents to clinic today for chemotherapy education.   He has been evaluated by Dr. Dr. Garcia for hearing loss evaluation.   Per review of report, 8/22/2022. 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 Test interpretation was mild to severe mixed hearing loss AU. Patient did not want hearing aids.   He continues to report symptoms of persistent dysphagia however is able to tolerate soft foods currently. He is reporting a pain rating of  8/10 today despite the use of Hydrocodone 7.5/325 mg. Taking one tablet every 6 hours. Last dose was this morning.   He has started radiation therapy on 8/29/2022.   He is smoking 10 cigs per day. Down from 3 packs one month ago. Continues to drink 2-3 beers every 2 days.     Patient has family history of bladder cancer in his father.  He lives by herself has his mother who lives close by has another sister who lives around.  He has an ECOG of 0 and continues to be independent with his ADLs and IADLs.  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  He has a heavy smoking history with 100 pack years however is trying to cut down on it and is down to 10 cigarettes per day.  He denies any recreational drug use.  He drinks 2 beers per day.    Pathology  07/18/2022:  Subglottic mass-invasive squamous cell carcinoma, well to moderately differentiated.  HPV negative    Imaging   07/28/2022 PET-CT-hypermetabolic mass centered in the right supraglottis, at the level of piriform sinus in keeping with the known cancer, measuring 2.7 x 2.5 x 3.4 cm with an SUV of 13.4.  No other metastatic disease appreciated.    Past Medical History:   Diagnosis Date    Depression     Hypertension     Prostate cancer        Past Surgical History:   Procedure Laterality Date    KNEE SURGERY      Left    NECK SURGERY      X 3       Family History   Problem Relation Age of Onset    Diabetes Mother     Breast cancer Mother     Bladder Cancer Father     Diabetes Sister     Diabetes Brother     Bladder Cancer Brother        Social History     Socioeconomic History    Marital status:    Tobacco Use    Smoking status: Every Day     Packs/day: 0.50     Types: Cigarettes    Smokeless tobacco: Never   Substance and Sexual Activity    Alcohol use: Yes     Comment: social       Current Outpatient Medications   Medication Sig Dispense Refill    amLODIPine (NORVASC) 10 MG tablet Take 10 mg by mouth once daily.      fluticasone propionate  (FLONASE) 50 mcg/actuation nasal spray 2 sprays by Each Nostril route once daily.      HYDROcodone-acetaminophen (NORCO) 7.5-325 mg per tablet Take 1 tablet by mouth every 6 (six) hours as needed.      irbesartan (AVAPRO) 300 MG tablet Take 300 mg by mouth once daily.      paroxetine (PAXIL) 40 MG tablet Take 1 tablet by mouth once daily.       No current facility-administered medications for this visit.       Review of patient's allergies indicates:  No Known Allergies    Review of system  CONSTITUTIONAL: no fevers, no chills, no weight loss, no fatigue, no weakness  HEMATOLOGIC: no abnormal bleeding, no abnormal bruising, no drenching night sweats  ONCOLOGIC: no new masses or lumps  HEENT: no vision loss, no tinnitus or+ hearing loss, no nose bleeding, + dysphagia, no odynophagia  CVS: no chest pain, no palpitations, no dyspnea on exertion  RESP: no shortness of breath, no hemoptysis, no cough  GI: + dysphagia. + odynophagia. no nausea, no vomiting, no diarrhea, no constipation, no melena, no hematochezia, no hematemesis, no abdominal pain, no increase in abdominal girth  : no dysuria, no hematuria, no hesitancy, no scrotal swelling, no discharge  INTEGUMENT: no rashes, no abnormal bruising, no nail pitting, no hyperpigmentation  NEURO: no falls, no memory loss, no paresthesias or dysesthesias, no urofecal incontinence or retention, no loss of strength on any extremity  MSK: no back pain, no new joint pain, no joint swelling  PSYCH: no suicidal or homicidal ideation, no depression, no insomnia, no anhedonia  ENDOCRINE: no heat or cold intolerance, no polyuria, no polydipsia    Physical Exam:  Vitals:    08/30/22 1103   BP: (!) 157/74   Pulse: (!) 56   Resp: 16   Temp: 98.5 °F (36.9 °C)      ECOG PS 0  GA: AAOx3, NAD  HEENT: hard of hearing  RESP: No labored breathing.   SKIN: no rashes to exposed skin, warm and dry  NEURO: normal mentation  Psych: appropriate mood and affect.     LABS:   8/19/2022 - creat 0.80  Alk phos 161, WBC 8.40. 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   He has been seen by radiation therapy with plans for definitive concurrent chemoradiation  Dr. Shipman previously discussed with patient his diagnosis, stage and treatment recommendations.    Dr. Shipman previously discussed the option to use weekly cisplatin versus carbo Taxol depending on his renal function and hearing assessment.    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 For this reason, Cisplatin will not be used.   He began radiation therapy on 8/29/2022.     # Encounter for antineoplastic chemotherapy.   Chemotherapy education: Plan of care including chemotherapy regimen, duration, frequency, schedule expectations and premedications and potential side effects including but not limited to:   Low blood cell counts, diarrhea, fatigue, N/V, rash, pruritus, hair loss, muscle/joint pain, taste changes, decrease appetite, mouth sores, edema, peripheral neuropathy(potential for being permanent), nail changes, allergic reaction.   Office contact information provided along with instructions on symptoms that warrant a call to the office, for example a temperature of 100.4 or greater, uncontrolled side effects, severe fatigue, etc.   Reviewed infection precautions and symptoms that require an immediate phone call to office and those that require ER evaluation. Management of body fluids. Recommended daily intake of water while undergoing chemotherapy reviewed.   Literature on carbo/taxol was provided. Risks and benefits of steroids reviewed.   Patient was given time to ask questions and express any concerns. All of his  questions were answered to his full satisfaction.   Ondansetron and Dexamethasone was e-sent to patient's pharmacy today.   He knows to  Immodium AD to have on hand.   Encouraged him to continue working on smoking cessation. Recommended he limit his alcohol consumption during therapy. Recommended he keep a symptom log.      Greater than 50% of this 35 minute visit was spent in face to face consultation and coordination of care. Remainder of time was spent preparing educational materials and reviewing chart notes    # Intermediate risk prostate cancer status post definitive radiation therapy in October 2019 currently on surveillance with Radiation Oncology.    Plan   # CBC, CMP and magnesium and hepatitis B panel today   # Patient is to return to clinic tomorrow(8/31/2022) with plans on starting weekly Carbo/Taxol.   # Continue concurrent radiation.   # follow-up in clinic prior to cycle 2(in one week) to assess treatment tolerance with Dr. Shipman. Labs to be done prior.   # Continue Norco for odynophagia. Dose increased today to 10/325 mg. RX sent to pharmacy.   # Patient was nor referred for Port placement. Per discussion with Dr. Shipman, can use a peripheral line access for now, can refer for port placement if needed.     Renetta Nieto, NP-C

## 2022-08-31 NOTE — TELEPHONE ENCOUNTER
Spoke with Ilir at [facility] states will need a new Rx that states okay to fill Norco 10mg today knowing that Pt received Norco 7.5mg from Dr Cornelius 4 days ago. Please advise.--SC, LPN

## 2022-08-31 NOTE — TELEPHONE ENCOUNTER
Ilir at pharmacy called and left a voice mail that patient was prescribed Narco 7.5 4 days ago by Dr. Darwin Reyes and she wanted to make sure you are aware and if you still need Narco refilled she will need documentation   Thanks

## 2022-09-06 NOTE — PROGRESS NOTES
Referring provider Dr. Cornelius   Reason for consult head and neck squamous cell carcinoma, HPV negative.        Current treatment  08/30/2022-current:  Concurrent chemo RT with weekly carbo Taxol    Patient is a 67-year-old with prior history of intermediate risk prostate cancer status post definitive radiation therapy who was seen by his primary care provider with symptoms of choking for about 4 months with started around April 2022 and progressively worsened along with changes in her voice.  He was subsequently referred to Dr. Park office and had a CT of his neck done on 30th of June which showed prominence concerning for underlying mass at the level of piriform sinus as well as nonspecific small bilateral adenopathy.  He underwent direct laryngoscopy with biopsy in July 2022 when he was found to have a very bulky fungating supraglottic mass occupying the entirety of the epiglottis as well as the right aryepiglottic fold extending into the tongue base and abutting the lateral pharyngeal wall on the right.  Pathology from this biopsy revealed invasive squamous cell carcinoma, moderately differentiated, HPV negative.  Patient subsequently had a PET-CT which not reveal any evidence of metastatic disease.  He was evaluated by ENT surgeons at [place] and deemed to be a poor candidate for for surgical resection.  Patient presented to clinic today to establish care for further recommendations including potential concurrent chemotherapy along with definitive radiation therapy.      Today 09/06/2022, patient reports tolerating chemotherapy well without any significant side effects.  He reports symptoms of odynophagia and dysphagia related to radiation which are controlled on his current pain regimen.  He denies any decreased appetite, weight loss.  He denies any nausea, vomiting, fevers or chills.    Patient has family history of bladder cancer in his father.  He lives by herself has his mother who lives close by  has another sister who lives around.  He has an ECOG of 0 and continues to be independent with his ADLs and IADLs.  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  He has a heavy smoking history with 100 pack years however is trying to cut down on it and is down to 10 cigarettes per day.  He denies any recreational drug use.  He drinks 2 beers per day.      Pathology  07/18/2022:  Subglottic mass-invasive squamous cell carcinoma, well to moderately differentiated.  HPV negative    Imaging   07/28/2022 PET-CT-hypermetabolic mass centered in the right supraglottis, at the level of piriform sinus in keeping with the known cancer, measuring 2.7 x 2.5 x 3.4 cm with an SUV of 13.4.  No other metastatic disease appreciated.    Laboratory  09/06/2022 creatinine 0.81, alkaline phosphatase 151, total bili 0.9, AST 16, ALT 21, WBC count 5.49, hemoglobin 15.2, platelet count 263, ANC 3.78.    Past Medical History:   Diagnosis Date    Depression     Hypertension     Prostate cancer        Past Surgical History:   Procedure Laterality Date    KNEE SURGERY      Left    NECK SURGERY      X 3       Family History   Problem Relation Age of Onset    Diabetes Mother     Breast cancer Mother     Bladder Cancer Father     Diabetes Sister     Diabetes Brother     Bladder Cancer Brother        Social History     Socioeconomic History    Marital status:    Tobacco Use    Smoking status: Every Day     Packs/day: 0.50     Types: Cigarettes    Smokeless tobacco: Never   Substance and Sexual Activity    Alcohol use: Yes     Comment: social    Drug use: Never    Sexual activity: Not Currently       Current Outpatient Medications   Medication Sig Dispense Refill    amLODIPine (NORVASC) 10 MG tablet Take 10 mg by mouth once daily.      dexAMETHasone (DECADRON) 4 MG Tab Take 5 tablets(20 mg)12 hours and 6 hours prior to chemotherapy. 25 tablet 0    fluticasone propionate (FLONASE) 50 mcg/actuation nasal spray 2 sprays by Each  Nostril route once daily.      HYDROcodone-acetaminophen (NORCO)  mg per tablet Take 1 tablet by mouth every 6 (six) hours as needed for Pain. 60 tablet 0    irbesartan (AVAPRO) 300 MG tablet Take 300 mg by mouth once daily.      ondansetron (ZOFRAN) 8 MG tablet Take 1 tablet (8 mg total) by mouth every 8 (eight) hours as needed for Nausea. 30 tablet 2    paroxetine (PAXIL) 40 MG tablet Take 1 tablet by mouth once daily.       No current facility-administered medications for this visit.       Review of patient's allergies indicates:  No Known Allergies    Review of system  CONSTITUTIONAL: no fevers, no chills, no weight loss, no fatigue, no weakness  HEMATOLOGIC: no abnormal bleeding, no abnormal bruising, no drenching night sweats  ONCOLOGIC: no new masses or lumps  HEENT: no vision loss, no tinnitus or+ hearing loss, no nose bleeding, + dysphagia, no odynophagia  CVS: no chest pain, no palpitations, no dyspnea on exertion  RESP: no shortness of breath, no hemoptysis, no cough  GI: no nausea, no vomiting, no diarrhea, no constipation, no melena, no hematochezia, no hematemesis, no abdominal pain, no increase in abdominal girth  : no dysuria, no hematuria, no hesitancy, no scrotal swelling, no discharge  INTEGUMENT: no rashes, no abnormal bruising, no nail pitting, no hyperpigmentation  NEURO: no falls, no memory loss, no paresthesias or dysesthesias, no urofecal incontinence or retention, no loss of strength on any extremity  MSK: no back pain, no new joint pain, no joint swelling  PSYCH: no suicidal or homicidal ideation, no depression, no insomnia, no anhedonia  ENDOCRINE: no heat or cold intolerance, no polyuria, no polydipsia    Physical Exam:  Vitals:    09/07/22 1022   BP: (!) 161/76   Pulse: 66   Resp: 18   Temp: 99.2 °F (37.3 °C)         ECOG PS 0  GA: AAOx3, NAD  HEENT: NCAT, PERRLA, EOMI, edentulous, moist oral mucous membranes, hard of hearing  LYMPH: no cervical, axillary or supraclavicular  adenopathy  CVS: s1s2 RRR, no M/R/G  RESP: CTA b/l, no crackles, no wheezes or rhonchi  ABD: soft, NT, ND, BS+, no hepatosplenomegaly  EXT: no deformities, no pedal edema  SKIN: no rashes, warm and dry  NEURO: normal mentation, strength 5/5 on all 4 extremities, no sensory deficits    Assessment and plan     # Laryngeal squamous cell carcinoma, 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   Discussed with patient his diagnosis, stage and treatment recommendations.    Discussed the option to use weekly cisplatin versus carbo Taxol depending on his renal function and hearing assessment.    Patient's hearing assessment with mild to severe mixed hearing loss.  Patient was started on concurrent chemo RT with carbo Taxol, status post cycle 1.      # Intermediate risk prostate cancer status post definitive radiation therapy in October 2019 currently on surveillance with Radiation Oncology.    Plan   Reviewed labs, okay to proceed with cycle 2 weekly Carbo Taxol today.  Patient will be scheduled for weekly Carbo Taxol with labs prior on 09/14/2022  Norco  mg q.6 hours p.r.n., refilled today  Plan to follow-up in 2 weeks with repeat labs to assess treatment tolerance        Portions of the record may have been created with voice recognition software. Occasional wrong-word or sound-a-like substitutions may have occurred due to the inherent limitations of voice recognition software. Read the chart carefully and recognize, using context, where substitutions have occurred.

## 2022-09-07 PROBLEM — Z51.11 ENCOUNTER FOR ANTINEOPLASTIC CHEMOTHERAPY: Status: ACTIVE | Noted: 2022-01-01

## 2022-09-07 PROBLEM — G89.3 CANCER RELATED PAIN: Status: ACTIVE | Noted: 2022-01-01

## 2022-09-21 NOTE — PROGRESS NOTES
Referring provider Dr. Cornelius   Reason for consult head and neck squamous cell carcinoma, HPV negative.        Current treatment  08/30/2022-current:  Concurrent chemo RT with weekly carbo Taxol    Patient is a 67-year-old with prior history of intermediate risk prostate cancer status post definitive radiation therapy who was seen by his primary care provider with symptoms of choking for about 4 months with started around April 2022 and progressively worsened along with changes in her voice.  He was subsequently referred to Dr. Park office and had a CT of his neck done on 30th of June which showed prominence concerning for underlying mass at the level of piriform sinus as well as nonspecific small bilateral adenopathy.  He underwent direct laryngoscopy with biopsy in July 2022 when he was found to have a very bulky fungating supraglottic mass occupying the entirety of the epiglottis as well as the right aryepiglottic fold extending into the tongue base and abutting the lateral pharyngeal wall on the right.  Pathology from this biopsy revealed invasive squamous cell carcinoma, moderately differentiated, HPV negative.  Patient subsequently had a PET-CT which not reveal any evidence of metastatic disease.  He was evaluated by ENT surgeons at [place] and deemed to be a poor candidate for for surgical resection.  Patient presented to clinic today to establish care for further recommendations including potential concurrent chemotherapy along with definitive radiation therapy.      Today 09/21/2022, patient reports tolerating his weekly carbo Taxol fairly well without any significant side effects.  He does report odynophagia and dysphagia which is fairly controlled on his current pain regimen which was revised recently by Dr. Cornelius.    Patient has family history of bladder cancer in his father.  He lives by herself has his mother who lives close by has another sister who lives around.  He has an ECOG of 0 and  continues to be independent with his ADLs and IADLs.  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  He has a heavy smoking history with 100 pack years however is trying to cut down on it and is down to 10 cigarettes per day.  He denies any recreational drug use.  He drinks 2 beers per day.      Pathology  07/18/2022:  Subglottic mass-invasive squamous cell carcinoma, well to moderately differentiated.  HPV negative    Imaging   07/28/2022 PET-CT-hypermetabolic mass centered in the right supraglottis, at the level of piriform sinus in keeping with the known cancer, measuring 2.7 x 2.5 x 3.4 cm with an SUV of 13.4.  No other metastatic disease appreciated.    Laboratory  09/20/2022:  Creatinine 0.79, rest of CMP unremarkable, WBC 2.88, hemoglobin 13.4, MCV 92.5, platelet count 237, ANC 1.91.    09/06/2022 creatinine 0.81, alkaline phosphatase 151, total bili 0.9, AST 16, ALT 21, WBC count 5.49, hemoglobin 15.2, platelet count 263, ANC 3.78.    Past Medical History:   Diagnosis Date    Depression     Hypertension     Prostate cancer        Past Surgical History:   Procedure Laterality Date    KNEE SURGERY      Left    NECK SURGERY      X 3       Family History   Problem Relation Age of Onset    Diabetes Mother     Breast cancer Mother     Bladder Cancer Father     Diabetes Sister     Diabetes Brother     Bladder Cancer Brother        Social History     Socioeconomic History    Marital status:    Tobacco Use    Smoking status: Every Day     Packs/day: 0.50     Types: Cigarettes    Smokeless tobacco: Never   Substance and Sexual Activity    Alcohol use: Yes     Comment: social    Drug use: Never    Sexual activity: Not Currently       Current Outpatient Medications   Medication Sig Dispense Refill    amLODIPine (NORVASC) 10 MG tablet Take 10 mg by mouth once daily.      dexAMETHasone (DECADRON) 4 MG Tab Take 5 tablets(20 mg)12 hours and 6 hours prior to chemotherapy. 25 tablet 0    fluticasone  propionate (FLONASE) 50 mcg/actuation nasal spray 2 sprays by Each Nostril route once daily.      irbesartan (AVAPRO) 300 MG tablet Take 300 mg by mouth once daily.      ondansetron (ZOFRAN) 8 MG tablet Take 1 tablet (8 mg total) by mouth every 8 (eight) hours as needed for Nausea. 30 tablet 2    oxyCODONE-acetaminophen (PERCOCET)  mg per tablet Take 1 tablet by mouth every 4 (four) hours as needed.      paroxetine (PAXIL) 40 MG tablet Take 1 tablet by mouth once daily.      HYDROcodone-acetaminophen (NORCO)  mg per tablet Take 1 tablet by mouth every 6 (six) hours as needed for Pain. (Patient not taking: Reported on 9/21/2022) 60 tablet 0     No current facility-administered medications for this visit.       Review of patient's allergies indicates:  No Known Allergies    Review of system  CONSTITUTIONAL: no fevers, no chills, no weight loss, no fatigue, no weakness  HEMATOLOGIC: no abnormal bleeding, no abnormal bruising, no drenching night sweats  ONCOLOGIC: no new masses or lumps  HEENT: no vision loss, no tinnitus or+ hearing loss, no nose bleeding, + dysphagia, no odynophagia  CVS: no chest pain, no palpitations, no dyspnea on exertion  RESP: no shortness of breath, no hemoptysis, no cough  GI: no nausea, no vomiting, no diarrhea, no constipation, no melena, no hematochezia, no hematemesis, no abdominal pain, no increase in abdominal girth  : no dysuria, no hematuria, no hesitancy, no scrotal swelling, no discharge  INTEGUMENT: no rashes, no abnormal bruising, no nail pitting, no hyperpigmentation  NEURO: no falls, no memory loss, no paresthesias or dysesthesias, no urofecal incontinence or retention, no loss of strength on any extremity  MSK: no back pain, no new joint pain, no joint swelling  PSYCH: no suicidal or homicidal ideation, no depression, no insomnia, no anhedonia  ENDOCRINE: no heat or cold intolerance, no polyuria, no polydipsia    Physical Exam:  Vitals:    09/21/22 0934   BP: (!)  148/69   Pulse: (!) 59   Resp: 18   Temp: 97.8 °F (36.6 °C)         ECOG PS 0  GA: AAOx3, NAD  HEENT: NCAT, PERRLA, EOMI, edentulous, moist oral mucous membranes, hard of hearing, erythema over neck.  Negative for thrush.  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   Discussed with patient his diagnosis, stage and treatment recommendations.    Discussed the option to use weekly cisplatin versus carbo Taxol depending on his renal function and hearing assessment.    Patient's hearing assessment with mild to severe mixed hearing loss.  Patient was started on concurrent chemo RT with carbo Taxol      # Intermediate risk prostate cancer status post definitive radiation therapy in October 2019 currently on surveillance with Radiation Oncology.    Plan   Reviewed labs, okay to proceed with cycle 4 weekly Carbo Taxol today.  Patient will be scheduled for weekly Carbo Taxol with labs prior on 09/28/2022  Pain management per radiation oncology  Plan to follow-up in 2 weeks with repeat labs to assess treatment tolerance        Portions of the record may have been created with voice recognition software. Occasional wrong-word or sound-a-like substitutions may have occurred due to the inherent limitations of voice recognition software. Read the chart carefully and recognize, using context, where substitutions have occurred.

## 2022-10-05 NOTE — PROGRESS NOTES
DATE:  10/05/2022    PROBLEM:  H & N SQ CA arising in the larynx. Concurrent chemo RT with weekly Carbo/Taxol.    HxPI:  Patient is a 67-year-old with prior history of intermediate risk prostate cancer status post definitive radiation therapy who was seen by his primary care provider with symptoms of choking for about 4 months with started around April 2022 and progressively worsened along with changes in her voice.  He was subsequently referred to Dr. Park office and had a CT of his neck done on 30th of June which showed prominence concerning for underlying mass at the level of piriform sinus as well as nonspecific small bilateral adenopathy.  He underwent direct laryngoscopy with biopsy in July 2022 when he was found to have a very bulky fungating supraglottic mass occupying the entirety of the epiglottis as well as the right aryepiglottic fold extending into the tongue base and abutting the lateral pharyngeal wall on the right.  Pathology from this biopsy revealed invasive squamous cell carcinoma, moderately differentiated, HPV negative.  Patient subsequently had a PET-CT which not reveal any evidence of metastatic disease.  He was evaluated by ENT surgeons at [facility] and deemed to be a poor candidate for for surgical resection.  Patient presented to clinic today to establish care for further recommendations including potential concurrent chemotherapy along with definitive radiation therapy.      INTERVAL Hx:  10/05/2022  Patient reports he is having a rough go with the combined chemo/XRT.  By my count, has about another week and a half to go with XRT.  He is here for another round of chemotherapy today.  Has significant radiation therapy dermatitis involving anterior aspect neck now with skin breakdown.    PMHx  Patient has family history of bladder cancer in his father.  He lives by herself has his mother who lives close by has another sister who lives around.  He has an ECOG of 0 and continues to be  independent with his ADLs and IADLs.  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  He has a heavy smoking history with 100 pack years however is trying to cut down on it and is down to 10 cigarettes per day.  He denies any recreational drug use.  He drinks 2 beers per day.      PATH:  2022:  Subglottic mass-invasive squamous cell carcinoma, well to moderately differentiated.  HPV negative    IMAGIN2022 PET-CT-hypermetabolic mass centered in the right supraglottis, at the level of piriform sinus in keeping with the known cancer, measuring 2.7 x 2.5 x 3.4 cm with an SUV of 13.4.  No other metastatic disease appreciated.    LAB:  2022:  Creatinine 0.79, rest of CMP unremarkable, WBC 2.88, hemoglobin 13.4, MCV 92.5, platelet count 237, ANC 1.91.    2022 creatinine 0.81, alkaline phosphatase 151, total bili 0.9, AST 16, ALT 21, WBC count 5.49, hemoglobin 15.2, platelet count 263, ANC 3.78.    Past Medical History:   Diagnosis Date    Depression     Hypertension     Prostate cancer        Past Surgical History:   Procedure Laterality Date    KNEE SURGERY      Left    NECK SURGERY      X 3       Family History   Problem Relation Age of Onset    Diabetes Mother     Breast cancer Mother     Bladder Cancer Father     Diabetes Sister     Diabetes Brother     Bladder Cancer Brother        Social History     Socioeconomic History    Marital status:    Tobacco Use    Smoking status: Former     Packs/day: 0.50     Types: Cigarettes    Smokeless tobacco: Never   Substance and Sexual Activity    Alcohol use: Yes     Comment: social    Drug use: Never    Sexual activity: Not Currently       Current Outpatient Medications   Medication Sig Dispense Refill    amLODIPine (NORVASC) 10 MG tablet Take 10 mg by mouth once daily.      dexAMETHasone (DECADRON) 4 MG Tab Take 5 tablets(20 mg)12 hours and 6 hours prior to chemotherapy. 25 tablet 0    fluticasone propionate (FLONASE) 50 mcg/actuation  nasal spray 2 sprays by Each Nostril route once daily.      HYDROcodone-acetaminophen (NORCO)  mg per tablet Take 1 tablet by mouth every 6 (six) hours as needed for Pain. 60 tablet 0    irbesartan (AVAPRO) 300 MG tablet Take 300 mg by mouth once daily.      ondansetron (ZOFRAN) 8 MG tablet Take 1 tablet (8 mg total) by mouth every 8 (eight) hours as needed for Nausea. 30 tablet 2    oxyCODONE-acetaminophen (PERCOCET)  mg per tablet Take 1 tablet by mouth every 4 (four) hours as needed.      paroxetine (PAXIL) 40 MG tablet Take 1 tablet by mouth once daily.       No current facility-administered medications for this visit.       Review of patient's allergies indicates:  No Known Allergies    ROS:  CONSTITUTIONAL: no fevers, no chills, no weight loss, no fatigue, no weakness  HEMATOLOGIC: no abnormal bleeding, no abnormal bruising, no drenching night sweats  ONCOLOGIC: no new masses or lumps  HEENT: See HxPI.  + dysphagia, no odynophagia  CVS: no chest pain, no palpitations, no dyspnea on exertion  RESP: no shortness of breath, no hemoptysis, no cough  GI: no nausea, no vomiting, no diarrhea, no constipation, no melena, no hematochezia, no hematemesis, no abdominal pain, no increase in abdominal girth  : no dysuria, no hematuria, no hesitancy, no scrotal swelling, no discharge  INTEGUMENT: no rashes, no abnormal bruising, no nail pitting, no hyperpigmentation  NEURO: no falls, no memory loss, no paresthesias or dysesthesias, no urofecal incontinence or retention, no loss of strength on any extremity  MSK: no back pain, no new joint pain, no joint swelling  PSYCH: no suicidal or homicidal ideation, no depression, no insomnia, no anhedonia  ENDOCRINE: no heat or cold intolerance, no polyuria, no polydipsia    OBSERVATIONS:  ECOG PS 0  GENERAL: AAOx3, NAD  VS:  Afebrile.  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   Now receiving concomitant chemo/XRT.    2.) Intermediate risk prostate cancer status post definitive radiation therapy in October 2019 currently on surveillance with Radiation Oncology.    PLAN:  Continue weekly carboplatin/Taxol systemic chemotherapy while XRT is under way.  We will have patient come back and see us next week.  Check CBC and CMP on RTC.  Radiation dermopathy management per radiation oncology service.    MELANIE BENAVIDEZ M.D., FACP